# Patient Record
Sex: MALE | Race: WHITE | NOT HISPANIC OR LATINO | Employment: FULL TIME | URBAN - METROPOLITAN AREA
[De-identification: names, ages, dates, MRNs, and addresses within clinical notes are randomized per-mention and may not be internally consistent; named-entity substitution may affect disease eponyms.]

---

## 2023-10-31 ENCOUNTER — HOSPITAL ENCOUNTER (OUTPATIENT)
Facility: CLINIC | Age: 43
Discharge: HOME | End: 2023-11-01
Attending: STUDENT IN AN ORGANIZED HEALTH CARE EDUCATION/TRAINING PROGRAM | Admitting: SURGERY
Payer: COMMERCIAL

## 2023-10-31 ENCOUNTER — APPOINTMENT (OUTPATIENT)
Dept: CT IMAGING | Facility: CLINIC | Age: 43
End: 2023-10-31
Attending: EMERGENCY MEDICINE
Payer: COMMERCIAL

## 2023-10-31 DIAGNOSIS — K35.30 ACUTE APPENDICITIS WITH LOCALIZED PERITONITIS, WITHOUT PERFORATION, ABSCESS, OR GANGRENE: ICD-10-CM

## 2023-10-31 LAB
ALBUMIN SERPL BCG-MCNC: 3.9 G/DL (ref 3.5–5.2)
ALBUMIN UR-MCNC: 70 MG/DL
ALP SERPL-CCNC: 75 U/L (ref 40–129)
ALT SERPL W P-5'-P-CCNC: 39 U/L (ref 0–70)
ANION GAP SERPL CALCULATED.3IONS-SCNC: 12 MMOL/L (ref 7–15)
APPEARANCE UR: CLEAR
AST SERPL W P-5'-P-CCNC: 34 U/L (ref 0–45)
BASOPHILS # BLD AUTO: 0.1 10E3/UL (ref 0–0.2)
BASOPHILS NFR BLD AUTO: 0 %
BILIRUB SERPL-MCNC: 1.1 MG/DL
BILIRUB UR QL STRIP: NEGATIVE
BUN SERPL-MCNC: 16.2 MG/DL (ref 6–20)
CALCIUM SERPL-MCNC: 8.5 MG/DL (ref 8.6–10)
CHLORIDE SERPL-SCNC: 101 MMOL/L (ref 98–107)
COLOR UR AUTO: YELLOW
CREAT SERPL-MCNC: 0.86 MG/DL (ref 0.67–1.17)
DEPRECATED HCO3 PLAS-SCNC: 25 MMOL/L (ref 22–29)
EGFRCR SERPLBLD CKD-EPI 2021: >90 ML/MIN/1.73M2
EOSINOPHIL # BLD AUTO: 0 10E3/UL (ref 0–0.7)
EOSINOPHIL NFR BLD AUTO: 0 %
ERYTHROCYTE [DISTWIDTH] IN BLOOD BY AUTOMATED COUNT: 12.9 % (ref 10–15)
GLUCOSE SERPL-MCNC: 111 MG/DL (ref 70–99)
GLUCOSE UR STRIP-MCNC: NEGATIVE MG/DL
HCT VFR BLD AUTO: 46.8 % (ref 40–53)
HGB BLD-MCNC: 16.1 G/DL (ref 13.3–17.7)
HGB UR QL STRIP: ABNORMAL
HOLD SPECIMEN: NORMAL
IMM GRANULOCYTES # BLD: 0.1 10E3/UL
IMM GRANULOCYTES NFR BLD: 0 %
KETONES UR STRIP-MCNC: 40 MG/DL
LEUKOCYTE ESTERASE UR QL STRIP: NEGATIVE
LIPASE SERPL-CCNC: 8 U/L (ref 13–60)
LYMPHOCYTES # BLD AUTO: 0.6 10E3/UL (ref 0.8–5.3)
LYMPHOCYTES NFR BLD AUTO: 4 %
MCH RBC QN AUTO: 31.5 PG (ref 26.5–33)
MCHC RBC AUTO-ENTMCNC: 34.4 G/DL (ref 31.5–36.5)
MCV RBC AUTO: 92 FL (ref 78–100)
MONOCYTES # BLD AUTO: 0.9 10E3/UL (ref 0–1.3)
MONOCYTES NFR BLD AUTO: 6 %
MUCOUS THREADS #/AREA URNS LPF: PRESENT /LPF
NEUTROPHILS # BLD AUTO: 15 10E3/UL (ref 1.6–8.3)
NEUTROPHILS NFR BLD AUTO: 90 %
NITRATE UR QL: NEGATIVE
NRBC # BLD AUTO: 0 10E3/UL
NRBC BLD AUTO-RTO: 0 /100
PH UR STRIP: 6.5 [PH] (ref 5–7)
PLATELET # BLD AUTO: 184 10E3/UL (ref 150–450)
POTASSIUM SERPL-SCNC: 3.4 MMOL/L (ref 3.4–5.3)
PROT SERPL-MCNC: 7 G/DL (ref 6.4–8.3)
RBC # BLD AUTO: 5.11 10E6/UL (ref 4.4–5.9)
RBC URINE: 6 /HPF
SODIUM SERPL-SCNC: 138 MMOL/L (ref 135–145)
SP GR UR STRIP: 1.03 (ref 1–1.03)
UROBILINOGEN UR STRIP-MCNC: 8 MG/DL
WBC # BLD AUTO: 16.7 10E3/UL (ref 4–11)
WBC URINE: 1 /HPF

## 2023-10-31 PROCEDURE — 81001 URINALYSIS AUTO W/SCOPE: CPT | Performed by: STUDENT IN AN ORGANIZED HEALTH CARE EDUCATION/TRAINING PROGRAM

## 2023-10-31 PROCEDURE — 93005 ELECTROCARDIOGRAM TRACING: CPT

## 2023-10-31 PROCEDURE — 250N000011 HC RX IP 250 OP 636: Performed by: EMERGENCY MEDICINE

## 2023-10-31 PROCEDURE — 120N000001 HC R&B MED SURG/OB

## 2023-10-31 PROCEDURE — 81001 URINALYSIS AUTO W/SCOPE: CPT | Performed by: EMERGENCY MEDICINE

## 2023-10-31 PROCEDURE — 36415 COLL VENOUS BLD VENIPUNCTURE: CPT | Performed by: EMERGENCY MEDICINE

## 2023-10-31 PROCEDURE — 74177 CT ABD & PELVIS W/CONTRAST: CPT

## 2023-10-31 PROCEDURE — 250N000011 HC RX IP 250 OP 636: Mod: JZ | Performed by: STUDENT IN AN ORGANIZED HEALTH CARE EDUCATION/TRAINING PROGRAM

## 2023-10-31 PROCEDURE — 85025 COMPLETE CBC W/AUTO DIFF WBC: CPT | Performed by: STUDENT IN AN ORGANIZED HEALTH CARE EDUCATION/TRAINING PROGRAM

## 2023-10-31 PROCEDURE — 250N000009 HC RX 250: Performed by: EMERGENCY MEDICINE

## 2023-10-31 PROCEDURE — 85004 AUTOMATED DIFF WBC COUNT: CPT | Performed by: EMERGENCY MEDICINE

## 2023-10-31 PROCEDURE — 80053 COMPREHEN METABOLIC PANEL: CPT | Performed by: EMERGENCY MEDICINE

## 2023-10-31 PROCEDURE — 258N000003 HC RX IP 258 OP 636: Performed by: SURGERY

## 2023-10-31 PROCEDURE — 250N000011 HC RX IP 250 OP 636: Mod: JZ | Performed by: SURGERY

## 2023-10-31 PROCEDURE — 80053 COMPREHEN METABOLIC PANEL: CPT | Performed by: STUDENT IN AN ORGANIZED HEALTH CARE EDUCATION/TRAINING PROGRAM

## 2023-10-31 PROCEDURE — 99285 EMERGENCY DEPT VISIT HI MDM: CPT | Mod: 25

## 2023-10-31 PROCEDURE — 83690 ASSAY OF LIPASE: CPT | Performed by: EMERGENCY MEDICINE

## 2023-10-31 PROCEDURE — 83690 ASSAY OF LIPASE: CPT | Performed by: STUDENT IN AN ORGANIZED HEALTH CARE EDUCATION/TRAINING PROGRAM

## 2023-10-31 RX ORDER — NALOXONE HYDROCHLORIDE 0.4 MG/ML
0.2 INJECTION, SOLUTION INTRAMUSCULAR; INTRAVENOUS; SUBCUTANEOUS
Status: DISCONTINUED | OUTPATIENT
Start: 2023-10-31 | End: 2023-11-01 | Stop reason: HOSPADM

## 2023-10-31 RX ORDER — NALOXONE HYDROCHLORIDE 0.4 MG/ML
0.4 INJECTION, SOLUTION INTRAMUSCULAR; INTRAVENOUS; SUBCUTANEOUS
Status: DISCONTINUED | OUTPATIENT
Start: 2023-10-31 | End: 2023-11-01 | Stop reason: HOSPADM

## 2023-10-31 RX ORDER — PIPERACILLIN SODIUM, TAZOBACTAM SODIUM 4; .5 G/20ML; G/20ML
4.5 INJECTION, POWDER, LYOPHILIZED, FOR SOLUTION INTRAVENOUS ONCE
Status: COMPLETED | OUTPATIENT
Start: 2023-10-31 | End: 2023-10-31

## 2023-10-31 RX ORDER — SODIUM CHLORIDE, SODIUM LACTATE, POTASSIUM CHLORIDE, CALCIUM CHLORIDE 600; 310; 30; 20 MG/100ML; MG/100ML; MG/100ML; MG/100ML
INJECTION, SOLUTION INTRAVENOUS CONTINUOUS
Status: DISCONTINUED | OUTPATIENT
Start: 2023-10-31 | End: 2023-11-01 | Stop reason: HOSPADM

## 2023-10-31 RX ORDER — ONDANSETRON 2 MG/ML
4 INJECTION INTRAMUSCULAR; INTRAVENOUS EVERY 6 HOURS PRN
Status: DISCONTINUED | OUTPATIENT
Start: 2023-10-31 | End: 2023-11-01 | Stop reason: HOSPADM

## 2023-10-31 RX ORDER — AMLODIPINE BESYLATE 10 MG/1
10 TABLET ORAL DAILY
COMMUNITY

## 2023-10-31 RX ORDER — LIDOCAINE 40 MG/G
CREAM TOPICAL
Status: DISCONTINUED | OUTPATIENT
Start: 2023-10-31 | End: 2023-11-01 | Stop reason: HOSPADM

## 2023-10-31 RX ORDER — LISINOPRIL 2.5 MG/1
2.5 TABLET ORAL DAILY
COMMUNITY

## 2023-10-31 RX ORDER — CITALOPRAM HYDROBROMIDE 20 MG/1
30 TABLET ORAL DAILY
COMMUNITY

## 2023-10-31 RX ORDER — ONDANSETRON 4 MG/1
4 TABLET, ORALLY DISINTEGRATING ORAL EVERY 6 HOURS PRN
Status: DISCONTINUED | OUTPATIENT
Start: 2023-10-31 | End: 2023-11-01 | Stop reason: HOSPADM

## 2023-10-31 RX ORDER — IOPAMIDOL 755 MG/ML
95 INJECTION, SOLUTION INTRAVASCULAR ONCE
Status: COMPLETED | OUTPATIENT
Start: 2023-10-31 | End: 2023-10-31

## 2023-10-31 RX ORDER — PIPERACILLIN SODIUM, TAZOBACTAM SODIUM 3; .375 G/15ML; G/15ML
3.38 INJECTION, POWDER, LYOPHILIZED, FOR SOLUTION INTRAVENOUS EVERY 6 HOURS
Status: DISCONTINUED | OUTPATIENT
Start: 2023-10-31 | End: 2023-11-01 | Stop reason: HOSPADM

## 2023-10-31 RX ORDER — HYDROMORPHONE HYDROCHLORIDE 1 MG/ML
0.5 INJECTION, SOLUTION INTRAMUSCULAR; INTRAVENOUS; SUBCUTANEOUS EVERY 30 MIN PRN
Status: DISCONTINUED | OUTPATIENT
Start: 2023-10-31 | End: 2023-11-01 | Stop reason: HOSPADM

## 2023-10-31 RX ORDER — HYDROMORPHONE HCL IN WATER/PF 6 MG/30 ML
.2-.5 PATIENT CONTROLLED ANALGESIA SYRINGE INTRAVENOUS
Status: DISCONTINUED | OUTPATIENT
Start: 2023-10-31 | End: 2023-11-01 | Stop reason: HOSPADM

## 2023-10-31 RX ADMIN — PIPERACILLIN AND TAZOBACTAM 4.5 G: 4; .5 INJECTION, POWDER, FOR SOLUTION INTRAVENOUS at 16:35

## 2023-10-31 RX ADMIN — ONDANSETRON 4 MG: 2 INJECTION INTRAMUSCULAR; INTRAVENOUS at 16:41

## 2023-10-31 RX ADMIN — IOPAMIDOL 95 ML: 755 INJECTION, SOLUTION INTRAVENOUS at 13:25

## 2023-10-31 RX ADMIN — SODIUM CHLORIDE 67 ML: 9 INJECTION, SOLUTION INTRAVENOUS at 13:27

## 2023-10-31 RX ADMIN — HYDROMORPHONE HYDROCHLORIDE 0.5 MG: 1 INJECTION, SOLUTION INTRAMUSCULAR; INTRAVENOUS; SUBCUTANEOUS at 16:35

## 2023-10-31 RX ADMIN — SODIUM CHLORIDE, POTASSIUM CHLORIDE, SODIUM LACTATE AND CALCIUM CHLORIDE: 600; 310; 30; 20 INJECTION, SOLUTION INTRAVENOUS at 18:38

## 2023-10-31 RX ADMIN — PIPERACILLIN AND TAZOBACTAM 3.38 G: 3; .375 INJECTION, POWDER, FOR SOLUTION INTRAVENOUS at 22:00

## 2023-10-31 ASSESSMENT — ACTIVITIES OF DAILY LIVING (ADL)
ADLS_ACUITY_SCORE: 18
ADLS_ACUITY_SCORE: 35
ADLS_ACUITY_SCORE: 18
ADLS_ACUITY_SCORE: 35

## 2023-10-31 NOTE — ED PROVIDER NOTES
"  History     Chief Complaint:  Abdominal Pain       HPI   Wm Gray is a 43 year old male with a history of Ave-Danlos syndrome, who presents evaluation of right lower quadrant abdominal pain for past 2 days.  Patient ports associated anorexia, vomiting, and chills.  Denies any fevers.  He reports a history of cholecystectomy as well as multiple abdominal surgeries for hernias, however no appendectomy.      Independent Historian:   None - Patient Only    Review of External Notes:   None       Medications:    No current outpatient medications on file.      Past Medical History:    No past medical history on file.    Past Surgical History:    No past surgical history on file.     Physical Exam   Patient Vitals for the past 24 hrs:   BP Temp Temp src Pulse Resp SpO2 Height Weight   10/31/23 1201 133/84 97  F (36.1  C) Temporal 69 18 100 % 1.727 m (5' 8\") 86.2 kg (190 lb)        Physical Exam  Vitals: Reviewed, as above.    General: Alert and oriented, in mild= distress. Resting on bed.  Skin: Warm and well-perfused. No rashes, lesions, or erythema.   HEENT:   Head: Normocephalic, atraumatic. Facial features symmetric.   Eyes: Conjunctiva pink, sclera white. EOMs grossly intact.   Ears: Auricles without lesion, erythema, or edema.   Nose: Symmetric with no discharge.  Mouth and throat: Lips are moist. Buccal mucosa is pink and moist without lesions. Oropharyngeal mucosa is pink and moist with no erythema, edema, or exudate. Uvula is midline.  Neck: Supple with no lymphadenopathy. Full ROM.   Pulmonary: Chest wall expansion symmetric with no increased work of breathing. Lungs clear to auscultation bilaterally.   Cardiovascular: Heart RRR with no murmurs. 2+ radial and tibialis posterior pulses bilaterally. No peripheral edema.  Abdominal: Multiple well-healed surgical scars.  Bowel sounds present and physiologic. Abdomen is soft.  Marked tenderness with patient in the right lower quadrant with involuntary muscle " rating.  No rebound tenderness.  Negative Rovsing sign. No masses or organomegaly.   Musculoskeletal: Moves all extremities spontaneously.  Psych: Affect appropriate.  Answers questions appropriately. Patient appears calm.      Emergency Department Course     ECG results from 10/31/23   EKG 12-lead, tracing only     Value    Systolic Blood Pressure     Diastolic Blood Pressure     Ventricular Rate 63    Atrial Rate 63    ME Interval 126    QRS Duration 82        QTc 392    P Axis 47    R AXIS 66    T Axis 45    Interpretation ECG      Sinus rhythm  Normal ECG  No previous ECGs available           Imaging:  CT Abdomen Pelvis w Contrast   Final Result   IMPRESSION:    1.  Acute appendicitis without evidence of gross rupture or drainable   fluid collection this time.      Findings discussed with triage at 1:49 PM on 10/31/2023 by Dr. Rod RAMIREZ MD            SYSTEM ID:  [id number]             Laboratory:  Labs Ordered and Resulted from Time of ED Arrival to Time of ED Departure   ROUTINE UA WITH MICROSCOPIC REFLEX TO CULTURE - Abnormal       Result Value    Color Urine Yellow      Appearance Urine Clear      Glucose Urine Negative      Bilirubin Urine Negative      Ketones Urine 40 (*)     Specific Gravity Urine 1.032      Blood Urine Trace (*)     pH Urine 6.5      Protein Albumin Urine 70 (*)     Urobilinogen Urine 8.0 (*)     Nitrite Urine Negative      Leukocyte Esterase Urine Negative      Mucus Urine Present (*)     RBC Urine 6 (*)     WBC Urine 1     COMPREHENSIVE METABOLIC PANEL - Abnormal    Sodium 138      Potassium 3.4      Carbon Dioxide (CO2) 25      Anion Gap 12      Urea Nitrogen 16.2      Creatinine 0.86      GFR Estimate >90      Calcium 8.5 (*)     Chloride 101      Glucose 111 (*)     Alkaline Phosphatase 75      AST 34      ALT 39      Protein Total 7.0      Albumin 3.9      Bilirubin Total 1.1     LIPASE - Abnormal    Lipase 8 (*)    CBC WITH PLATELETS AND DIFFERENTIAL -  Abnormal    WBC Count 16.7 (*)     RBC Count 5.11      Hemoglobin 16.1      Hematocrit 46.8      MCV 92      MCH 31.5      MCHC 34.4      RDW 12.9      Platelet Count 184      % Neutrophils 90      % Lymphocytes 4      % Monocytes 6      % Eosinophils 0      % Basophils 0      % Immature Granulocytes 0      NRBCs per 100 WBC 0      Absolute Neutrophils 15.0 (*)     Absolute Lymphocytes 0.6 (*)     Absolute Monocytes 0.9      Absolute Eosinophils 0.0      Absolute Basophils 0.1      Absolute Immature Granulocytes 0.1      Absolute NRBCs 0.0            Emergency Department Course & Assessments:             Interventions:  Medications   HYDROmorphone (PF) (DILAUDID) injection 0.5 mg (0.5 mg Intravenous $Given 10/31/23 1635)   piperacillin-tazobactam (ZOSYN) 4.5 g vial to attach to  mL bag (4.5 g Intravenous $New Bag 10/31/23 1635)   melatonin tablet 1 mg (has no administration in time range)   ondansetron (ZOFRAN ODT) ODT tab 4 mg ( Oral See Alternative 10/31/23 1641)     Or   ondansetron (ZOFRAN) injection 4 mg (4 mg Intravenous $Given 10/31/23 1641)   lidocaine 1 % 0.1-1 mL (has no administration in time range)   lidocaine (LMX4) cream (has no administration in time range)   sodium chloride (PF) 0.9% PF flush 3 mL (has no administration in time range)   sodium chloride (PF) 0.9% PF flush 3 mL (has no administration in time range)   lactated ringers infusion (has no administration in time range)   HYDROmorphone (DILAUDID) injection 0.2-0.5 mg (has no administration in time range)   piperacillin-tazobactam (ZOSYN) 3.375 g vial to attach to  mL bag (has no administration in time range)   iopamidol (ISOVUE-370) solution 95 mL (95 mLs Intravenous $Given 10/31/23 1325)   sodium chloride 0.9 % bag 500 mL for CT scan flush use (67 mLs Intravenous $Given 10/31/23 1327)        Independent Interpretation (X-rays, CTs, rhythm strip):  None    Assessments/Consultations/Discussion of Management or Tests:  ED Course as  of 10/31/23 1648   Tue Oct 31, 2023   1610 I evaluated the patient, as noted above.   1632 I spoke with Dr. Marroquin, general surgeon, regarding the patient's presentation, findings, and plan of care.  He will accept the patient for admission with plan for likely appendectomy tomorrow.   1635 I rechecked the patient and explained findings.       Social Determinants of Health affecting care:   None    Disposition:  The patient was admitted to the hospital under the care of Dr. Marroquin    Impression & Plan      Medical Decision Making:  Wm Gray is a 43 year old male with a history of Ave-Danlos syndrome, who presents evaluation of right lower quadrant abdominal pain for past 2 days.  Please HPI and exam details per differential includes abscess, diverticulitis, abscess, bowel pressure, and x-ray patient has an exam and history concerning for emesis, and CT of the abdomen and pelvis with contrast obtained from triage confirms acute appendicitis without perforation or abscess.  I discussed with Dr. Marroquin, of the general surgical service, who graciously accept the patient for admission.  Patient was initiated on Zosyn and administered Dilaudid for his acute pain.  Patient is comfortable with the plan for admission and likely appendectomy tomorrow.      Diagnosis:    ICD-10-CM    1. Acute appendicitis with localized peritonitis, without perforation, abscess, or gangrene  K35.30 Case Request: APPENDECTOMY, LAPAROSCOPIC     Case Request: APPENDECTOMY, LAPAROSCOPIC                Mara Childress PA-C  10/31/23 164

## 2023-10-31 NOTE — ED PROVIDER NOTES
ED ATTENDING PHYSICIAN NOTE:   I evaluated this patient in conjunction with Mara Childress PA-C  I have participated in the care of the patient and personally performed key elements of the history, exam, and medical decision making.      HPI:   Wm Gray is a 43 year old male with a history of Ave-Danlos syndrome, who presents evaluation of right lower quadrant abdominal pain for past 2 days.  Patient ports associated anorexia, vomiting, and chills.  Denies any fevers.  He reports a history of cholecystectomy as well as multiple abdominal surgeries for hernias, however no appendectomy.    Independent Historian:   None - Patient Only    Review of External Notes:   I reviewed a family medicine note from 5/13/2021    EXAM:   General: Alert, No distress. Nontoxic appearance  Head: No signs of trauma.   Mouth/Throat: Oropharynx moist.   Eyes: Conjunctivae are normal. Pupils are equal..   Neck: Normal range of motion.    CV: Appears well perfused.  Resp:No respiratory distress.   MSK: Normal range of motion. No obvious deformity.   Neuro: The patient is alert and interactive. RIOS. Speech normal. GCS 15  Skin: No lesion or sign of trauma noted.   Psych: normal mood and affect. behavior is normal.       Independent Interpretation (X-rays, CTs, rhythm strip):  Abdominal CT shows no evidence of free air    Consultations/Discussion of Management or Tests:  We consulted with general surgery and the admitting hospitalist    Social Determinants of Health affecting care:   Stress/Adjustment Disorders     MEDICAL DECISION MAKING/ASSESSMENT AND PLAN:   This patient presents with 2 days of abdominal pain that began periumbilical and now has moved into the right lower quadrant.  Abdominal CT scan is showing evidence for appendicitis.  No evidence for appendiceal perforation or development of intra-abdominal abscess.  The patient will be admitted to the hospitalist with consultation to general surgery.  He will likely undergo  surgery tomorrow.     DIAGNOSIS:     ICD-10-CM    1. Acute appendicitis with localized peritonitis, without perforation, abscess, or gangrene  K35.30 Case Request: APPENDECTOMY, LAPAROSCOPIC     Case Request: APPENDECTOMY, LAPAROSCOPIC           DISPOSITION:   Admit to hospital with consult to general surgery     Scribe Disclosure:  TIFFANY Faboralyunier Chun, am serving as a scribe at 6:08 PM on 10/31/2023 to document services personally performed by Homar Paula MD, based on my observations and the provider's statements to me.   10/31/2023  [facility] EMERGENCY DEPT       Homar Paula MD  11/01/23 1201

## 2023-10-31 NOTE — ED NOTES
"Tele-PIT/Intake Evaluation      Video-Visit Details    Type of service:  Video Visit    Video Start Time (time video started): 12:02 PM  Video End Time (time video stopped): 12:04 PM   Originating Location (pt. Location): [facility]  Distant Location (provider location):  [facility]  Mode of Communication:  Video Conference via [email or web address]  Patient verbally consented to [email or web address] televisit.    History:  43-year-old male presenting with right lower quadrant pain that is constant for the past couple of days and associated with hot flashes, anorexia, vomiting.  Has had cholecystectomy in the past but no history of appendectomy.    Exam:  Constitutional: healthy, alert, active, and no distress  Head: Normocephalic. Atraumatic.  ENT: Nose and external ears normal in appearance   Respiratory: Normal effort and rate, no respiratory distress   Musculoskeletal: No obvious deformities   Skin: No obvious rashes or lesions on exposed skin   Neurologic: Awake and alert, moving all extremities   Psychiatric: Normal behavior      Patient Vitals for the past 24 hrs:   BP Temp Temp src Pulse Resp SpO2 Height Weight   10/31/23 1201 133/84 97  F (36.1  C) Temporal 69 18 100 % 1.727 m (5' 8\") 86.2 kg (190 lb)       Appropriate interventions for symptom management were initiated if applicable.  Appropriate diagnostic tests were initiated if indicated.    Important information for subsequent clinician:  Appendicitis work-up with labs and CT scan ordered    I briefly evaluated the patient and developed an initial plan of care. I discussed this plan and explained that this brief interaction does not constitute a full evaluation. Patient/family understands that they should wait to be fully evaluated and discuss any test results with another clinician prior to leaving the hospital.       Davey Ashley MD  10/31/23 1205    "

## 2023-10-31 NOTE — ED TRIAGE NOTES
Pt reports 2 days of LRQ abdominal pain. Pt notes the pain is constant and sharp. Pt notes having his gallbladder removed.      Triage Assessment (Adult)       Row Name 10/31/23 1202          Triage Assessment    Airway WDL WDL        Respiratory WDL    Respiratory WDL WDL        Skin Circulation/Temperature WDL    Skin Circulation/Temperature WDL WDL        Cardiac WDL    Cardiac WDL WDL        Peripheral/Neurovascular WDL    Peripheral Neurovascular WDL WDL        Cognitive/Neuro/Behavioral WDL    Cognitive/Neuro/Behavioral WDL WDL

## 2023-10-31 NOTE — PHARMACY-ADMISSION MEDICATION HISTORY
Pharmacist Admission Medication History    Admission medication history is complete. The information provided in this note is only as accurate as the sources available at the time of the update.    Information Source(s): Patient and CareEverywhere/SureScripts via in-person    Pertinent Information:   -Surescript amlodipine 5mg daily but pt says it was increased to 10 mg. Also citalopram increased.     Changes made to PTA medication list:  Added: All  Deleted: None  Changed: None      Allergies reviewed with patient and updates made in EHR: no    Medication History Completed By: Akil Aliheyder, RPH 10/31/2023 4:53 PM    PTA Med List   Medication Sig Last Dose    amLODIPine (NORVASC) 10 MG tablet Take 10 mg by mouth daily 10/30/2023    citalopram (CELEXA) 20 MG tablet Take 30 mg by mouth daily 1.5 tab of 20 mg 10/30/2023    lisinopril (ZESTRIL) 2.5 MG tablet Take 2.5 mg by mouth daily 10/30/2023    omeprazole (PRILOSEC) 20 MG DR capsule Take 20 mg by mouth daily 10/30/2023

## 2023-10-31 NOTE — ED NOTES
[facility]  ED Nurse Handoff Report    ED Chief complaint: Abdominal Pain      ED Diagnosis:   Final diagnoses:   Acute appendicitis with localized peritonitis, without perforation, abscess, or gangrene       Code Status: Full Code    Allergies: No Known Allergies    Patient Story: Wm Gray is a 43 year old male with a history of Ave-Danlos syndrome, who presents evaluation of right lower quadrant abdominal pain for past 2 days.  Patient ports associated anorexia, vomiting, and chills.  Denies any fevers.  He reports a history of cholecystectomy as well as multiple abdominal surgeries for hernias, however no appendectomy.   Focused Assessment:  A&Ox4, pain in RLQ, see MAR for pain meds given. Intermittent nausea, see MAR for zofran given. VSS on RA.  Labs Ordered and Resulted from Time of ED Arrival to Time of ED Departure   ROUTINE UA WITH MICROSCOPIC REFLEX TO CULTURE - Abnormal       Result Value    Color Urine Yellow      Appearance Urine Clear      Glucose Urine Negative      Bilirubin Urine Negative      Ketones Urine 40 (*)     Specific Gravity Urine 1.032      Blood Urine Trace (*)     pH Urine 6.5      Protein Albumin Urine 70 (*)     Urobilinogen Urine 8.0 (*)     Nitrite Urine Negative      Leukocyte Esterase Urine Negative      Mucus Urine Present (*)     RBC Urine 6 (*)     WBC Urine 1     COMPREHENSIVE METABOLIC PANEL - Abnormal    Sodium 138      Potassium 3.4      Carbon Dioxide (CO2) 25      Anion Gap 12      Urea Nitrogen 16.2      Creatinine 0.86      GFR Estimate >90      Calcium 8.5 (*)     Chloride 101      Glucose 111 (*)     Alkaline Phosphatase 75      AST 34      ALT 39      Protein Total 7.0      Albumin 3.9      Bilirubin Total 1.1     LIPASE - Abnormal    Lipase 8 (*)    CBC WITH PLATELETS AND DIFFERENTIAL - Abnormal    WBC Count 16.7 (*)     RBC Count 5.11      Hemoglobin 16.1      Hematocrit 46.8      MCV 92      MCH 31.5      MCHC 34.4      RDW 12.9       "Platelet Count 184      % Neutrophils 90      % Lymphocytes 4      % Monocytes 6      % Eosinophils 0      % Basophils 0      % Immature Granulocytes 0      NRBCs per 100 WBC 0      Absolute Neutrophils 15.0 (*)     Absolute Lymphocytes 0.6 (*)     Absolute Monocytes 0.9      Absolute Eosinophils 0.0      Absolute Basophils 0.1      Absolute Immature Granulocytes 0.1      Absolute NRBCs 0.0        CT Abdomen Pelvis w Contrast   Final Result   IMPRESSION:    1.  Acute appendicitis without evidence of gross rupture or drainable   fluid collection this time.      Findings discussed with triage at 1:49 PM on 10/31/2023 by Dr. Rod RAMIREZ MD            SYSTEM ID:  [id number]           Treatments and/or interventions provided: see MAR  Patient's response to treatments and/or interventions: improvement in pain    To be done/followed up on inpatient unit:  cont with admitting MD orders    Does this patient have any cognitive concerns?:  none    Activity level - Baseline/Home:  Independent  Activity Level - Current:   Independent    Patient's Preferred language: English   Needed?: No    Isolation: None  Infection: Not Applicable  Patient tested for COVID 19 prior to admission: NO  Bariatric?: No    Vital Signs:   Vitals:    10/31/23 1201 10/31/23 1635 10/31/23 1700   BP: 133/84 (!) 136/90 123/77   Pulse: 69 87 70   Resp: 18  26   Temp: 97  F (36.1  C)     TempSrc: Temporal     SpO2: 100% 93% 96%   Weight: 86.2 kg (190 lb)     Height: 1.727 m (5' 8\")         Cardiac Rhythm:     Was the PSS-3 completed:   Yes  What interventions are required if any?               Family Comments: none at bedside      For the majority of the shift this patient's behavior was Green.   Behavioral interventions performed were none needed.    ED NURSE PHONE NUMBER: [phone]        "

## 2023-11-01 ENCOUNTER — ANESTHESIA (OUTPATIENT)
Dept: SURGERY | Facility: CLINIC | Age: 43
End: 2023-11-01
Payer: COMMERCIAL

## 2023-11-01 ENCOUNTER — ANESTHESIA EVENT (OUTPATIENT)
Dept: SURGERY | Facility: CLINIC | Age: 43
End: 2023-11-01
Payer: COMMERCIAL

## 2023-11-01 VITALS
TEMPERATURE: 97.9 F | HEIGHT: 68 IN | SYSTOLIC BLOOD PRESSURE: 119 MMHG | HEART RATE: 58 BPM | WEIGHT: 190 LBS | OXYGEN SATURATION: 93 % | RESPIRATION RATE: 12 BRPM | BODY MASS INDEX: 28.79 KG/M2 | DIASTOLIC BLOOD PRESSURE: 77 MMHG

## 2023-11-01 LAB
ATRIAL RATE - MUSE: 63 BPM
DIASTOLIC BLOOD PRESSURE - MUSE: NORMAL MMHG
INTERPRETATION ECG - MUSE: NORMAL
P AXIS - MUSE: 47 DEGREES
PR INTERVAL - MUSE: 126 MS
QRS DURATION - MUSE: 82 MS
QT - MUSE: 384 MS
QTC - MUSE: 392 MS
R AXIS - MUSE: 66 DEGREES
SYSTOLIC BLOOD PRESSURE - MUSE: NORMAL MMHG
T AXIS - MUSE: 45 DEGREES
VENTRICULAR RATE- MUSE: 63 BPM

## 2023-11-01 PROCEDURE — 250N000013 HC RX MED GY IP 250 OP 250 PS 637: Performed by: PHYSICIAN ASSISTANT

## 2023-11-01 PROCEDURE — 44970 LAPAROSCOPY APPENDECTOMY: CPT | Performed by: SURGERY

## 2023-11-01 PROCEDURE — 0DTJ4ZZ RESECTION OF APPENDIX, PERCUTANEOUS ENDOSCOPIC APPROACH: ICD-10-PCS | Performed by: SURGERY

## 2023-11-01 PROCEDURE — 88304 TISSUE EXAM BY PATHOLOGIST: CPT | Mod: 26 | Performed by: PATHOLOGY

## 2023-11-01 PROCEDURE — 258N000003 HC RX IP 258 OP 636: Performed by: ANESTHESIOLOGY

## 2023-11-01 PROCEDURE — 99222 1ST HOSP IP/OBS MODERATE 55: CPT | Mod: 57 | Performed by: SURGERY

## 2023-11-01 PROCEDURE — 272N000001 HC OR GENERAL SUPPLY STERILE: Performed by: SURGERY

## 2023-11-01 PROCEDURE — 999N000141 HC STATISTIC PRE-PROCEDURE NURSING ASSESSMENT: Performed by: SURGERY

## 2023-11-01 PROCEDURE — 250N000011 HC RX IP 250 OP 636: Mod: JZ | Performed by: SURGERY

## 2023-11-01 PROCEDURE — 710N000009 HC RECOVERY PHASE 1, LEVEL 1, PER MIN: Performed by: SURGERY

## 2023-11-01 PROCEDURE — 250N000011 HC RX IP 250 OP 636

## 2023-11-01 PROCEDURE — 250N000025 HC SEVOFLURANE, PER MIN: Performed by: SURGERY

## 2023-11-01 PROCEDURE — 250N000011 HC RX IP 250 OP 636: Performed by: SURGERY

## 2023-11-01 PROCEDURE — 44970 LAPAROSCOPY APPENDECTOMY: CPT | Mod: AS | Performed by: PHYSICIAN ASSISTANT

## 2023-11-01 PROCEDURE — 370N000017 HC ANESTHESIA TECHNICAL FEE, PER MIN: Performed by: SURGERY

## 2023-11-01 PROCEDURE — 360N000076 HC SURGERY LEVEL 3, PER MIN: Performed by: SURGERY

## 2023-11-01 PROCEDURE — 710N000012 HC RECOVERY PHASE 2, PER MINUTE: Performed by: SURGERY

## 2023-11-01 PROCEDURE — 258N000003 HC RX IP 258 OP 636: Performed by: SURGERY

## 2023-11-01 PROCEDURE — 250N000009 HC RX 250: Performed by: ANESTHESIOLOGY

## 2023-11-01 PROCEDURE — 88304 TISSUE EXAM BY PATHOLOGIST: CPT | Mod: TC | Performed by: SURGERY

## 2023-11-01 PROCEDURE — 250N000011 HC RX IP 250 OP 636: Performed by: ANESTHESIOLOGY

## 2023-11-01 RX ORDER — DEXAMETHASONE SODIUM PHOSPHATE 4 MG/ML
INJECTION, SOLUTION INTRA-ARTICULAR; INTRALESIONAL; INTRAMUSCULAR; INTRAVENOUS; SOFT TISSUE PRN
Status: DISCONTINUED | OUTPATIENT
Start: 2023-11-01 | End: 2023-11-01

## 2023-11-01 RX ORDER — HYDROCODONE BITARTRATE AND ACETAMINOPHEN 5; 325 MG/1; MG/1
1 TABLET ORAL
Status: COMPLETED | OUTPATIENT
Start: 2023-11-01 | End: 2023-11-01

## 2023-11-01 RX ORDER — LIDOCAINE HYDROCHLORIDE 20 MG/ML
INJECTION, SOLUTION INFILTRATION; PERINEURAL PRN
Status: DISCONTINUED | OUTPATIENT
Start: 2023-11-01 | End: 2023-11-01

## 2023-11-01 RX ORDER — BUPIVACAINE HYDROCHLORIDE 5 MG/ML
INJECTION, SOLUTION EPIDURAL; INTRACAUDAL PRN
Status: DISCONTINUED | OUTPATIENT
Start: 2023-11-01 | End: 2023-11-01 | Stop reason: HOSPADM

## 2023-11-01 RX ORDER — FENTANYL CITRATE 50 UG/ML
INJECTION, SOLUTION INTRAMUSCULAR; INTRAVENOUS PRN
Status: DISCONTINUED | OUTPATIENT
Start: 2023-11-01 | End: 2023-11-01

## 2023-11-01 RX ORDER — AMOXICILLIN 250 MG
1-2 CAPSULE ORAL 2 TIMES DAILY PRN
Qty: 10 TABLET | Refills: 0 | Status: SHIPPED | OUTPATIENT
Start: 2023-11-01

## 2023-11-01 RX ORDER — DEXMEDETOMIDINE HYDROCHLORIDE 4 UG/ML
INJECTION, SOLUTION INTRAVENOUS PRN
Status: DISCONTINUED | OUTPATIENT
Start: 2023-11-01 | End: 2023-11-01

## 2023-11-01 RX ORDER — PROPOFOL 10 MG/ML
INJECTION, EMULSION INTRAVENOUS PRN
Status: DISCONTINUED | OUTPATIENT
Start: 2023-11-01 | End: 2023-11-01

## 2023-11-01 RX ORDER — ONDANSETRON 2 MG/ML
INJECTION INTRAMUSCULAR; INTRAVENOUS PRN
Status: DISCONTINUED | OUTPATIENT
Start: 2023-11-01 | End: 2023-11-01

## 2023-11-01 RX ORDER — SODIUM CHLORIDE, SODIUM LACTATE, POTASSIUM CHLORIDE, CALCIUM CHLORIDE 600; 310; 30; 20 MG/100ML; MG/100ML; MG/100ML; MG/100ML
INJECTION, SOLUTION INTRAVENOUS CONTINUOUS
Status: DISCONTINUED | OUTPATIENT
Start: 2023-11-01 | End: 2023-11-01 | Stop reason: HOSPADM

## 2023-11-01 RX ORDER — HYDROCODONE BITARTRATE AND ACETAMINOPHEN 5; 325 MG/1; MG/1
1-2 TABLET ORAL EVERY 4 HOURS PRN
Qty: 15 TABLET | Refills: 0 | Status: SHIPPED | OUTPATIENT
Start: 2023-11-01

## 2023-11-01 RX ORDER — HYDROMORPHONE HYDROCHLORIDE 1 MG/ML
INJECTION, SOLUTION INTRAMUSCULAR; INTRAVENOUS; SUBCUTANEOUS PRN
Status: DISCONTINUED | OUTPATIENT
Start: 2023-11-01 | End: 2023-11-01

## 2023-11-01 RX ORDER — CEFAZOLIN SODIUM 1 G/3ML
INJECTION, POWDER, FOR SOLUTION INTRAMUSCULAR; INTRAVENOUS PRN
Status: DISCONTINUED | OUTPATIENT
Start: 2023-11-01 | End: 2023-11-01

## 2023-11-01 RX ADMIN — DEXMEDETOMIDINE HYDROCHLORIDE 20 MCG: 200 INJECTION INTRAVENOUS at 12:46

## 2023-11-01 RX ADMIN — PIPERACILLIN AND TAZOBACTAM 3.38 G: 3; .375 INJECTION, POWDER, FOR SOLUTION INTRAVENOUS at 09:41

## 2023-11-01 RX ADMIN — MIDAZOLAM 2 MG: 1 INJECTION INTRAMUSCULAR; INTRAVENOUS at 12:12

## 2023-11-01 RX ADMIN — ROCURONIUM BROMIDE 10 MG: 50 INJECTION, SOLUTION INTRAVENOUS at 12:52

## 2023-11-01 RX ADMIN — PHENYLEPHRINE HYDROCHLORIDE 100 MCG: 10 INJECTION INTRAVENOUS at 12:36

## 2023-11-01 RX ADMIN — ROCURONIUM BROMIDE 30 MG: 50 INJECTION, SOLUTION INTRAVENOUS at 12:31

## 2023-11-01 RX ADMIN — SUCCINYLCHOLINE CHLORIDE 180 MG: 20 INJECTION, SOLUTION INTRAMUSCULAR; INTRAVENOUS; PARENTERAL at 12:20

## 2023-11-01 RX ADMIN — SODIUM CHLORIDE, POTASSIUM CHLORIDE, SODIUM LACTATE AND CALCIUM CHLORIDE: 600; 310; 30; 20 INJECTION, SOLUTION INTRAVENOUS at 05:10

## 2023-11-01 RX ADMIN — HYDROMORPHONE HYDROCHLORIDE 0.5 MG: 0.2 INJECTION, SOLUTION INTRAMUSCULAR; INTRAVENOUS; SUBCUTANEOUS at 02:06

## 2023-11-01 RX ADMIN — HYDROMORPHONE HYDROCHLORIDE 0.5 MG: 1 INJECTION, SOLUTION INTRAMUSCULAR; INTRAVENOUS; SUBCUTANEOUS at 13:30

## 2023-11-01 RX ADMIN — LIDOCAINE HYDROCHLORIDE 60 MG: 20 INJECTION, SOLUTION INFILTRATION; PERINEURAL at 12:19

## 2023-11-01 RX ADMIN — SUGAMMADEX 200 MG: 100 INJECTION, SOLUTION INTRAVENOUS at 13:14

## 2023-11-01 RX ADMIN — PIPERACILLIN AND TAZOBACTAM 3.38 G: 3; .375 INJECTION, POWDER, FOR SOLUTION INTRAVENOUS at 04:29

## 2023-11-01 RX ADMIN — ONDANSETRON 4 MG: 2 INJECTION INTRAMUSCULAR; INTRAVENOUS at 13:11

## 2023-11-01 RX ADMIN — DEXAMETHASONE SODIUM PHOSPHATE 4 MG: 4 INJECTION, SOLUTION INTRA-ARTICULAR; INTRALESIONAL; INTRAMUSCULAR; INTRAVENOUS; SOFT TISSUE at 12:33

## 2023-11-01 RX ADMIN — PROPOFOL 200 MG: 10 INJECTION, EMULSION INTRAVENOUS at 12:19

## 2023-11-01 RX ADMIN — FENTANYL CITRATE 100 MCG: 50 INJECTION INTRAMUSCULAR; INTRAVENOUS at 12:19

## 2023-11-01 RX ADMIN — HYDROCODONE BITARTRATE AND ACETAMINOPHEN 1 TABLET: 5; 325 TABLET ORAL at 14:39

## 2023-11-01 RX ADMIN — CEFAZOLIN 2 G: 1 INJECTION, POWDER, FOR SOLUTION INTRAMUSCULAR; INTRAVENOUS at 12:38

## 2023-11-01 RX ADMIN — PROPOFOL 35 MCG/KG/MIN: 10 INJECTION, EMULSION INTRAVENOUS at 12:20

## 2023-11-01 RX ADMIN — PHENYLEPHRINE HYDROCHLORIDE 100 MCG: 10 INJECTION INTRAVENOUS at 13:27

## 2023-11-01 RX ADMIN — SODIUM CHLORIDE, POTASSIUM CHLORIDE, SODIUM LACTATE AND CALCIUM CHLORIDE 500 ML: 600; 310; 30; 20 INJECTION, SOLUTION INTRAVENOUS at 10:50

## 2023-11-01 RX ADMIN — SODIUM CHLORIDE, POTASSIUM CHLORIDE, SODIUM LACTATE AND CALCIUM CHLORIDE: 600; 310; 30; 20 INJECTION, SOLUTION INTRAVENOUS at 13:57

## 2023-11-01 RX ADMIN — PHENYLEPHRINE HYDROCHLORIDE 100 MCG: 10 INJECTION INTRAVENOUS at 12:27

## 2023-11-01 ASSESSMENT — ACTIVITIES OF DAILY LIVING (ADL)
ADLS_ACUITY_SCORE: 18

## 2023-11-01 ASSESSMENT — ENCOUNTER SYMPTOMS: SEIZURES: 0

## 2023-11-01 ASSESSMENT — LIFESTYLE VARIABLES: TOBACCO_USE: 0

## 2023-11-01 NOTE — OP NOTE
Preop Dx: Acute appendicitis  Postop Dx: Same  Procedure: Laparoscopic Appendectomy  Surgeon: Jeffrey Odom MD.  Assistant: Mayito Jacobo PA-C.  Assistant needed for exposure,, operation, and wound management.  Anesthesia : General  EBL: minimal    Events:  After induction of general anesthesia, Wm Gray abdomen was prepped and draped in the usual sterile fashion.  Using a direct vision technique on the left upper quadrant the abdomen was entered and pneumoperitoneum established.  Abdominal exploration revealed no evidence of injury.  Inflammatory process noted in the right lower quadrant.  Multiple adhesions noted but not in the way of our operation.  Two additional trocars where placed in the lower abdomen.  The appendix was identified and a window created in the mesoappendix at the level of the cecal junction; thru this opening endo LAURA staplers where used to control and transect the appendix and its blood supply independently was taken with ligasure device.  Specimen placed in endobag, removed from abdomen and sent to pathology.  Staple line hemostasis was secured.  No other pathology noted.  Trocars removed under direct visualization, no bleeding noted.  Pneumoperitoneum released, and abdominal fascia closed at the 12 mm port site with interrupted 0 - vycril  sutures. Skin approximated with absorbable suture, steri-strips and sterile dressing applied.  Counts reported correct.  No immediate complications.

## 2023-11-01 NOTE — DISCHARGE INSTRUCTIONS
Same Day Surgery Discharge Instructions for  Sedation and General Anesthesia     It's not unusual to feel dizzy, light-headed or faint for up to 24 hours after surgery or while taking pain medication.  If you have these symptoms: sit for a few minutes before standing and have someone assist you when you get up to walk or use the bathroom.    You should rest and relax for the next 24 hours. We recommend you make arrangements to have an adult stay with you for at least 24 hours after your discharge.  Avoid hazardous and strenuous activity.    DO NOT DRIVE any vehicle or operate mechanical equipment for 24 hours following the end of your surgery.  Even though you may feel normal, your reactions may be affected by the medication you have received.    Do not drink alcoholic beverages for 24 hours following surgery.     Slowly progress to your regular diet as you feel able. It's not unusual to feel nauseated and/or vomit after receiving anesthesia.  If you develop these symptoms, drink clear liquids (apple juice, ginger ale, broth, 7-up, etc. ) until you feel better.  If your nausea and vomiting persists for 24 hours, please notify your surgeon.      All narcotic pain medications, along with inactivity and anesthesia, can cause constipation. Drinking plenty of liquids and increasing fiber intake will help.    For any questions of a medical nature, call your surgeon.    Do not make important decisions for 24 hours.    If you had general anesthesia, you may have a sore throat for a couple of days related to the breathing tube used during surgery.  You may use Cepacol lozenges to help with this discomfort.  If it worsens or if you develop a fever, contact your surgeon.     If you feel your pain is not well managed with the pain medications prescribed by your surgeon, please contact your surgeon's office to let them know so they can address your concerns.          [facility]  Discharge  Instructions: Post-Operative Appendectomy    ACTIVITY  Expect to feel tired after your surgery.  This will gradually resolve.    Take frequent, short walks and increase your activity gradually.    Avoid strenuous physical activity or heavy lifting greater than 15-20 lbs. for 2-3 weeks.  You may climb stairs.  You may drive without restrictions when you are not using any prescription pain medication and feel comfortable in a car.  You may return to work/school when you are comfortable without any prescription pain medication.    WOUND CARE  You may remove your outer dressing or Band-Aids and shower 48 hours after the surgery.  Pat your incisions dry and leave them open to air.  Re-apply dressing (Band-Aids or gauze/tape) as needed for comfort or drainage.  You may have steri-strips (looks like white tape) on your incision.  You may peel off the steri-strips 2 weeks after your surgery if they have not peeled off on their own.  If you have skin glue, it will peel up and fall off on its own.  Do not soak your incisions in a tub or pool for 2 weeks.   Do not apply any lotions, creams, or ointments to your incisions.  A ridge under your incisions is normal and will gradually resolve.    DIET  Start with liquids, then gradually resume your regular diet as tolerated.  Avoid heavy, spicy, and greasy meals for 2-3 days.  Drink plenty of fluids to stay hydrated.    PAIN  Expect some tenderness and discomfort at the incision sites.  Use the prescribed pain medication at your discretion.  Expect gradual resolution of your pain over several days.  You may take ibuprofen with food (unless you have been told not to) or acetaminophen/Tylenol instead of or in addition to your prescribed pain medication.  However, if you are taking Norco or Percocet, do not take any additional acetaminophen/Tylenol.  Do not drink alcohol or drive while you are taking pain medications.  You may apply ice to your incisions in 20 minute intervals as  needed for the next 48 hours.  After that time, consider switching to heat if you prefer.    EXPECTATIONS  Pain medications can cause constipation.  Limit use when possible.  Take an over the counter or prescribed stool softener/stimulant, such as Colace or Senna, 1-2 times a day with plenty of water.  You may take a mild over the counter laxative, such as Miralax or a suppository, as needed.  You make discontinue these medications once you are having regular bowel movements and/or are no longer taking your narcotic pain medication.   You may have shoulder or upper back discomfort due to the gas used in surgery.  This is temporary and should resolve in 48-72 hours.  Short, frequent walks may help with this.  If you are unable to urinate for 8 hours or feel as though you are not emptying your bladder adequately, we recommend you seek care at an ER or Urgent Care facility for possible catheter placement.     FOLLOW UP  Our office will contact you in approximately 2-3 weeks to check on your progress and answer any questions you may have.  If you are doing well, you will not need to return for a follow up appointment.  If any concerns are identified over the phone, we will help you make an appointment to see a provider.   If you have not received a phone call, have any questions or concerns, or would like to be seen, please call us at [phone] and ask to speak with our nurse.  We are located at [address] [place] [address].    CALL OUR OFFICE -[phone] IF YOU HAVE:   Chills or fever above 101 F.  Increased redness, warmth, or drainage at your incisions.  Significant bleeding.  Pain not relieved by your pain medication or rest.  Increasing pain after the first 48 hours.  Any other concerns or questions.             Revised October 2022

## 2023-11-01 NOTE — CONSULTS
General surgery note    We have been asked to see Mr. Gray as he presents with abdominal pain and findings of acute appendicitis.  He has a surgical history of multiple open and laparoscopic umbilical/periumbilical hernia repairs.  Within the last 2 to 3 years he also had a laparoscopic cholecystectomy.    He presents to the emergency room with several days of abdominal pain.  He is found to have elevated white blood cell count and CT scan of the abdomen showing acute appendicitis.    On exam he is awake alert and cooperative.  His abdomen is flat, incisions are well-healed.  There is no peritoneal signs but there is tenderness to palpation in the right lower quadrant.    Acute appendicitis.    I discussed with him the rationale for laparoscopic appendectomy.  The risk, benefits, hopeful outcomes, possible complications of this type procedure were explained in detail.  He understands and would like to proceed with surgical intervention.    Total encounter time 45 minutes, more than half spent in counseling, review of data, and coordination of care.

## 2023-11-01 NOTE — ANESTHESIA POSTPROCEDURE EVALUATION
Patient: Wm Gray    Procedure: Procedure(s):  APPENDECTOMY, LAPAROSCOPIC       Anesthesia Type:  General    Note:  Disposition: Inpatient   Postop Pain Control: Uneventful            Sign Out: Well controlled pain   PONV: No   Neuro/Psych: Uneventful            Sign Out: Acceptable/Baseline neuro status   Airway/Respiratory: Uneventful            Sign Out: Acceptable/Baseline resp. status   CV/Hemodynamics: Uneventful            Sign Out: Acceptable CV status; No obvious hypovolemia; No obvious fluid overload   Other NRE: NONE   DID A NON-ROUTINE EVENT OCCUR?            Last vitals:  Vitals Value Taken Time   /76 11/01/23 1400   Temp 36.6  C (97.8  F) 11/01/23 1345   Pulse 60 11/01/23 1414   Resp 11 11/01/23 1414   SpO2 90 % 11/01/23 1414   Vitals shown include unfiled device data.    Electronically Signed By: Magalys Lindsey  November 1, 2023  2:15 PM

## 2023-11-01 NOTE — ANESTHESIA PREPROCEDURE EVALUATION
"Anesthesia Pre-Procedure Evaluation    Patient: Wm Gray   MRN: [id number] : 1980        Procedure : Procedure(s):  APPENDECTOMY, LAPAROSCOPIC          No past medical history on file.   No past surgical history on file.   No Known Allergies   Social History     Tobacco Use    Smoking status: Not on file    Smokeless tobacco: Not on file   Substance Use Topics    Alcohol use: Not on file      Wt Readings from Last 1 Encounters:   10/31/23 86.2 kg (190 lb)        Anesthesia Evaluation   Pt has had prior anesthetic.     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           Physical Exam    Airway        Mallampati: I   TM distance: > 3 FB   Neck ROM: full   Mouth opening: > 3 cm    Respiratory Devices and Support         Dental       (+) Minor Abnormalities - some fillings, tiny chips      Cardiovascular   cardiovascular exam normal          Pulmonary   pulmonary exam normal                OUTSIDE LABS:  CBC:   Lab Results   Component Value Date    WBC 16.7 (H) 10/31/2023    HGB 16.1 10/31/2023    HCT 46.8 10/31/2023     10/31/2023     BMP:   Lab Results   Component Value Date     10/31/2023    POTASSIUM 3.4 10/31/2023    CHLORIDE 101 10/31/2023    CO2 25 10/31/2023    BUN 16.2 10/31/2023    CR 0.86 10/31/2023     (H) 10/31/2023     COAGS: No results found for: \"PTT\", \"INR\", \"FIBR\"  POC: No results found for: \"BGM\", \"HCG\", \"HCGS\"  HEPATIC:   Lab Results   Component Value Date    ALBUMIN 3.9 10/31/2023    PROTTOTAL 7.0 10/31/2023    ALT 39 10/31/2023    AST 34 10/31/2023 "    ALKPHOS 75 10/31/2023    BILITOTAL 1.1 10/31/2023     OTHER:   Lab Results   Component Value Date    RADHA 8.5 (L) 10/31/2023    LIPASE 8 (L) 10/31/2023       Anesthesia Plan    ASA Status:  2       Anesthesia Type: General.     - Airway: ETT   Induction: Intravenous.   Maintenance: Balanced.   Techniques and Equipment:     - Airway: Video-Laryngoscope       Consents    Anesthesia Plan(s) and associated risks, benefits, and realistic alternatives discussed. Questions answered and patient/representative(s) expressed understanding.     - Discussed:     - Discussed with:  Patient            Postoperative Care    Pain management: IV analgesics, Oral pain medications.   PONV prophylaxis: Ondansetron (or other 5HT-3), Dexamethasone or Solumedrol, Background Propofol Infusion     Comments:                Magalys Lindsey

## 2023-11-01 NOTE — ANESTHESIA CARE TRANSFER NOTE
Patient: Wm Gray    Procedure: Procedure(s):  APPENDECTOMY, LAPAROSCOPIC       Diagnosis: Acute appendicitis with localized peritonitis, without perforation, abscess, or gangrene [K35.30]  Diagnosis Additional Information: No value filed.    Anesthesia Type:   General     Note:    Oropharynx: oropharynx clear of all foreign objects  Level of Consciousness: awake  Oxygen Supplementation: face mask    Independent Airway: airway patency satisfactory and stable  Dentition: dentition unchanged  Vital Signs Stable: post-procedure vital signs reviewed and stable  Report to RN Given: handoff report given  Patient transferred to: PACU    Handoff Report: Identifed the Patient, Identified the Reponsible Provider, Reviewed the pertinent medical history, Discussed the surgical course, Reviewed Intra-OP anesthesia mangement and issues during anesthesia, Set expectations for post-procedure period and Allowed opportunity for questions and acknowledgement of understanding  Vitals:  Vitals Value Taken Time   /77 11/01/23 1335   Temp     Pulse 60 11/01/23 1338   Resp 26 11/01/23 1338   SpO2 98 % 11/01/23 1338   Vitals shown include unfiled device data.    Electronically Signed By: EMILIO Malcolm CRNA  November 1, 2023  1:39 PM

## 2023-11-01 NOTE — OR NURSING
Discharge instructions reviewed with Stephania, , over the phone in presence of pt. All questions answered. No further concerns at this point. Teach back method used to ensure patient and family/friend understanding. Narcotics sent home with seal in place along with stool softener. IV removed. All belongings returned. Pt awaiting ride to discharge.

## 2023-11-01 NOTE — PLAN OF CARE
Date & Time: 2000-0730.  Surgery/POD#: here with Acute appendicitis.  Behavior & Aggression: Green - no concerns.  Fall Risk: No.  Orientation:A&Ox4.  ABNL VS/O2: VSS on RA, exp mild temp.  ABNL Labs: see chart.  Pain Management:PRN Dilaudid.  Bowel/Bladder: Continent, voiding spontaneously, active bowel sounds, passing gas per pt.  IV/Drains/Skin: PIV infusing LR @ 100ml/hr. Abdomen tender to palpation in RLQ - Skin is WDL.  Diet: NPO w/ ice chips.  Activity Level: IND.  Tests/Procedures: Plan for Lap Appendectomy - scheduled for 10:55am.  Anticipated  DC Date: Possibly today?.

## 2023-11-01 NOTE — PROGRESS NOTES
.RECEIVING UNIT ED HANDOFF REVIEW    ED Nurse Handoff Report was reviewed by: Lorri Hummel RN on October 31, 2023 at 7:45 PM

## 2023-11-01 NOTE — PLAN OF CARE
11/1/23 9100-0420    Acute appendicitis resulting in appendectomy     Pt A&O x 4. VSS on RA. IND in room. Shane wipes completed, preop abx administered. Pt down to pre op @1100. Pt discharging home from PACU, all belongings transported down to PACU

## 2023-11-01 NOTE — ANESTHESIA PROCEDURE NOTES
Airway       Patient location during procedure: OR       Procedure Start/Stop Times: 11/1/2023 12:21 PM  Staff -        CRNA: Trent Garrido APRN CRNA       Performed By: CRNA  Consent for Airway        Urgency: emergent  Indications and Patient Condition       Indications for airway management: dell-procedural and airway protection       Induction type:intravenous       Mask difficulty assessment: 0 - not attempted    Final Airway Details       Final airway type: endotracheal airway       Successful airway: ETT - single and Oral  Endotracheal Airway Details        ETT size (mm): 8.0       Cuffed: yes       Successful intubation technique: video laryngoscopy       VL Blade Size: Glidescope 4       Grade View of Cords: 1       Adjucts: stylet       Position: Right       Measured from: lips       Secured at (cm): 23       Bite block used: None    Post intubation assessment        Placement verified by: capnometry, equal breath sounds and chest rise        Number of attempts at approach: 1       Number of other approaches attempted: 0       Secured with: silk tape       Ease of procedure: easy       Dentition: Intact and Unchanged    Medication(s) Administered   Medication Administration Time: 11/1/2023 12:21 PM

## 2023-11-01 NOTE — BRIEF OP NOTE
[facility]    Brief Operative Note    Pre-operative diagnosis: Acute appendicitis with localized peritonitis, without perforation, abscess, or gangrene [K35.30]  Post-operative diagnosis Acute Appendicitis    Procedure: APPENDECTOMY, LAPAROSCOPIC, N/A - Abdomen    Surgeon: Surgeon(s) and Role:     * Jeffrey Odom MD - Primary     * Mayito Jacobo PA-C - Assisting    Anesthesia: General   Estimated Blood Loss: Less than 10 ml    Drains: None  Specimens:   ID Type Source Tests Collected by Time Destination   1 : Appendix Tissue Appendix SURGICAL PATHOLOGY EXAM Jeffrey Odom MD 11/1/2023  1:05 PM      Findings:   None.  Hyperemic inflamed appendix without perforation.  Several adhesions mostly omental but some bowel to abdominal wall too.      Complications: None.  Implants: * No implants in log *        Mayito Jacobo PA-C  [phone]

## 2023-11-02 LAB
PATH REPORT.COMMENTS IMP SPEC: NORMAL
PATH REPORT.COMMENTS IMP SPEC: NORMAL
PATH REPORT.FINAL DX SPEC: NORMAL
PATH REPORT.GROSS SPEC: NORMAL
PATH REPORT.MICROSCOPIC SPEC OTHER STN: NORMAL
PATH REPORT.RELEVANT HX SPEC: NORMAL
PHOTO IMAGE: NORMAL

## 2023-11-02 NOTE — DISCHARGE SUMMARY
"[facility] Discharge Summary    Wm Gray MRN# [id number]   Age: 43 year old YOB: 1980     Date of Admission:  10/31/2023  Date of Discharge:  11/1/2023  4:19 PM  Admitting Provider:  Mark Marroquin MD  Discharge Provider:  Mayito Jacobo PA-C  Discharging Service: General Surgery     Primary Provider: No Ref-Primary, Physician  Primary Care Physician Phone Number: None          Admission Diagnoses:   Principle Diagnosis: Acute appendicitis with localized peritonitis, without perforation, abscess, or gangrene [K35.30]  Secondary Diagnoses:          Discharge Diagnosis:     Acute appendicitis with localized peritonitis, without perforation, abscess, or gangrene [K35.30]          Procedures:     Procedure(s): Laparoscopic appendectomy            Discharge Medications:     Discharge Medication List as of 11/1/2023  2:40 PM        START taking these medications    Details   HYDROcodone-acetaminophen (NORCO) 5-325 MG tablet Take 1-2 tablets by mouth every 4 hours as needed for moderate to severe pain, Disp-15 tablet, R-0, E-Prescribe      senna-docusate (SENOKOT-S/PERICOLACE) 8.6-50 MG tablet Take 1-2 tablets by mouth 2 times daily as needed for constipation (While on oral opioids.), Disp-10 tablet, R-0, E-Prescribe           CONTINUE these medications which have NOT CHANGED    Details   amLODIPine (NORVASC) 10 MG tablet Take 10 mg by mouth daily, Historical      citalopram (CELEXA) 20 MG tablet Take 30 mg by mouth daily 1.5 tab of 20 mg, Historical      lisinopril (ZESTRIL) 2.5 MG tablet Take 2.5 mg by mouth daily, Historical      omeprazole (PRILOSEC) 20 MG DR capsule Take 20 mg by mouth daily, Historical                 Allergies:       No Known Allergies          Brief History of Illness:   Per Mara Childress PA-C note from ED on 10/31/23:  \"Wm Gray is a 43 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  Patient ports associated anorexia, vomiting, and chills.  Denies any fevers.  He reports a history of cholecystectomy as well as multiple abdominal surgeries for hernias, however no appendectomy.\"     He was found to have an acute appendicitis on CT and General Surgery was contacted  for laparoscopic appendectomy.  After discussing the risks, benefits, and possible complications, informed consent was obtained and the patient underwent the above procedure.  There were no complications.  Please see the Operative Report for full details.           Hospital Course:   Wm Gray's hospital course was unremarkable.  He recovered as anticipated and experienced no post-operative complications. He met criteria to be discharged from the PACU.    On the date of discharge, the patient was discharged to home in stable condition and afebrile.  He verbalized understanding of all discharge instructions and felt comfortable with the discharge plan.  He was asked to call with any further questions or concerns.         Condition on Discharge:        Discharge condition: Stable   Discharge vitals: Blood pressure 119/77, pulse 58, temperature 97.9  F (36.6  C), temperature source Temporal, resp. rate 12, height 1.727 m (5' 8\"), weight 86.2 kg (190 lb), SpO2 93%.           Discharge Disposition:     Discharged to home          Discharge Instructions and Follow-Up:      Wm Gray was asked to follow up with surgical team in 1-2 weeks.      Mayito Jacobo PA-C  Dictating on behalf of Dr. Jeffrey Odom  General Surgery  [facility]  [phone]       "

## 2023-11-16 ENCOUNTER — TELEPHONE (OUTPATIENT)
Dept: SURGERY | Facility: CLINIC | Age: 43
End: 2023-11-16
Payer: COMMERCIAL

## 2023-11-16 NOTE — TELEPHONE ENCOUNTER
[facility]  Post op call note     Wm Gray was called for an update regarding his recovery.  He underwent a Lap Appy by Dr. Odom on November / 01 / 2023 .  Today he tells me he is doing well and denies any complaints.  He is eating a normal diet and his bowels are regular.  He states his wounds are healing well and denies any erythema or drainage at his wounds.     The pathology revealed acute appendicitis.  This was discussed with the patient.     He was instructed to gradually resume all normal activities. The patient states all of his questions were answered and he agrees to follow up in clinic as needed.    Demetrio Jacobo PA-C        Please route or send letter to:  Primary Care Provider (PCP)

## (undated) DEVICE — ENDO TROCAR FIRST ENTRY KII FIOS Z-THRD 05X100MM CTF03

## (undated) DEVICE — ESU LIGASURE MARYLAND LAPAROSCOPIC SLR/DVDR 5MMX37CM LF1937

## (undated) DEVICE — ENDO POUCH UNIV RETRIEVAL SYSTEM INZII 10MM CD001

## (undated) DEVICE — LINEN TOWEL PACK X5 5464

## (undated) DEVICE — ENDO SCOPE WARMER LF TM500

## (undated) DEVICE — PREP CHLORAPREP 26ML TINTED HI-LITE ORANGE 930815

## (undated) DEVICE — SU VICRYL 0 UR-6 27" J603H

## (undated) DEVICE — ENDO TROCAR SLEEVE KII Z-THREADED 05X100MM CTS02

## (undated) DEVICE — SOL NACL 0.9% INJ 1000ML BAG 2B1324X

## (undated) DEVICE — PACK LAP CHOLE SLC15LCFSD

## (undated) DEVICE — ESU HOLDER LAP INST DISP PURPLE LONG 330MM H-PRO-330

## (undated) DEVICE — GLOVE BIOGEL PI MICRO INDICATOR UNDERGLOVE SZ 7.5 48975

## (undated) DEVICE — STPL RELOAD REG TISSUE ECHELON 45 X 3.6MM BLUE GST45B

## (undated) DEVICE — SU VICRYL 4-0 PS-2 18" UND J496H

## (undated) DEVICE — STPL ENDO ARTICULATING 45MM EC45A

## (undated) DEVICE — ENDO TROCAR BLUNT TIP KII BALLOON 12X100MM C0R47

## (undated) DEVICE — DECANTER VIAL 2006S

## (undated) DEVICE — SOL WATER IRRIG 1000ML BOTTLE 2F7114

## (undated) DEVICE — GLOVE BIOGEL PI MICRO SZ 7.5 48575

## (undated) DEVICE — ESU GROUND PAD UNIVERSAL W/O CORD

## (undated) DEVICE — STPL ENDO RELOAD ECHELON 45X2.0MM GREY ECR45M

## (undated) DEVICE — BLADE CLIPPER 4406

## (undated) DEVICE — DECANTER BAG 2002S

## (undated) RX ORDER — HYDROCODONE BITARTRATE AND ACETAMINOPHEN 5; 325 MG/1; MG/1
TABLET ORAL
Status: DISPENSED
Start: 2023-11-01

## (undated) RX ORDER — CEFAZOLIN SODIUM/WATER 2 G/20 ML
SYRINGE (ML) INTRAVENOUS
Status: DISPENSED
Start: 2023-11-01

## (undated) RX ORDER — PROPOFOL 10 MG/ML
INJECTION, EMULSION INTRAVENOUS
Status: DISPENSED
Start: 2023-11-01

## (undated) RX ORDER — FENTANYL CITRATE 50 UG/ML
INJECTION, SOLUTION INTRAMUSCULAR; INTRAVENOUS
Status: DISPENSED
Start: 2023-11-01

## (undated) RX ORDER — ONDANSETRON 2 MG/ML
INJECTION INTRAMUSCULAR; INTRAVENOUS
Status: DISPENSED
Start: 2023-11-01

## (undated) RX ORDER — HYDROMORPHONE HYDROCHLORIDE 1 MG/ML
INJECTION, SOLUTION INTRAMUSCULAR; INTRAVENOUS; SUBCUTANEOUS
Status: DISPENSED
Start: 2023-11-01

## (undated) RX ORDER — BUPIVACAINE HYDROCHLORIDE 5 MG/ML
INJECTION, SOLUTION EPIDURAL; INTRACAUDAL
Status: DISPENSED
Start: 2023-11-01

## (undated) RX ORDER — DEXAMETHASONE SODIUM PHOSPHATE 4 MG/ML
INJECTION, SOLUTION INTRA-ARTICULAR; INTRALESIONAL; INTRAMUSCULAR; INTRAVENOUS; SOFT TISSUE
Status: DISPENSED
Start: 2023-11-01